# Patient Record
Sex: MALE | URBAN - METROPOLITAN AREA
[De-identification: names, ages, dates, MRNs, and addresses within clinical notes are randomized per-mention and may not be internally consistent; named-entity substitution may affect disease eponyms.]

---

## 2017-03-04 ENCOUNTER — EMERGENCY (EMERGENCY)
Facility: HOSPITAL | Age: 22
LOS: 0 days | Discharge: HOME | End: 2017-03-05
Attending: EMERGENCY MEDICINE | Admitting: EMERGENCY MEDICINE
Payer: COMMERCIAL

## 2017-03-04 VITALS — HEIGHT: 73 IN | WEIGHT: 315 LBS

## 2017-03-04 DIAGNOSIS — F10.129 ALCOHOL ABUSE WITH INTOXICATION, UNSPECIFIED: ICD-10-CM

## 2017-03-04 PROCEDURE — 99284 EMERGENCY DEPT VISIT MOD MDM: CPT

## 2017-03-04 RX ORDER — SODIUM CHLORIDE 9 MG/ML
2000 INJECTION INTRAMUSCULAR; INTRAVENOUS; SUBCUTANEOUS
Qty: 0 | Refills: 0 | Status: DISCONTINUED | OUTPATIENT
Start: 2017-03-04 | End: 2017-03-05

## 2017-03-04 RX ADMIN — SODIUM CHLORIDE 500 MILLILITER(S): 9 INJECTION INTRAMUSCULAR; INTRAVENOUS; SUBCUTANEOUS at 23:51

## 2017-03-04 NOTE — ED ADULT NURSE NOTE - OBJECTIVE STATEMENT
presents to the ED for alcohol intoxication. as per family at bedside, he vomited 3 times after binge drinking this evening. pt alert but not speaking coherently. no signs of respiratory distress. breathing even and unlabored. no complaints at this time. placed on cardiac monitor. will monitor.

## 2017-03-05 VITALS
SYSTOLIC BLOOD PRESSURE: 124 MMHG | OXYGEN SATURATION: 99 % | HEART RATE: 82 BPM | RESPIRATION RATE: 16 BRPM | TEMPERATURE: 99 F | DIASTOLIC BLOOD PRESSURE: 60 MMHG

## 2017-03-05 LAB
ALBUMIN SERPL ELPH-MCNC: 4.2 G/DL — SIGNIFICANT CHANGE UP (ref 3.3–5)
ALP SERPL-CCNC: 82 U/L — SIGNIFICANT CHANGE UP (ref 40–120)
ALT FLD-CCNC: 52 U/L — SIGNIFICANT CHANGE UP (ref 12–78)
ANION GAP SERPL CALC-SCNC: 11 MMOL/L — SIGNIFICANT CHANGE UP (ref 5–17)
AST SERPL-CCNC: 34 U/L — SIGNIFICANT CHANGE UP (ref 15–37)
BASOPHILS # BLD AUTO: 0.1 K/UL — SIGNIFICANT CHANGE UP (ref 0–0.2)
BASOPHILS NFR BLD AUTO: 1.7 % — SIGNIFICANT CHANGE UP (ref 0–2)
BILIRUB SERPL-MCNC: 0.3 MG/DL — SIGNIFICANT CHANGE UP (ref 0.2–1.2)
BUN SERPL-MCNC: 13 MG/DL — SIGNIFICANT CHANGE UP (ref 7–23)
CALCIUM SERPL-MCNC: 8.6 MG/DL — SIGNIFICANT CHANGE UP (ref 8.5–10.1)
CHLORIDE SERPL-SCNC: 110 MMOL/L — HIGH (ref 96–108)
CO2 SERPL-SCNC: 25 MMOL/L — SIGNIFICANT CHANGE UP (ref 22–31)
CREAT SERPL-MCNC: 1.27 MG/DL — SIGNIFICANT CHANGE UP (ref 0.5–1.3)
EOSINOPHIL # BLD AUTO: 0.1 K/UL — SIGNIFICANT CHANGE UP (ref 0–0.5)
EOSINOPHIL NFR BLD AUTO: 1.9 % — SIGNIFICANT CHANGE UP (ref 0–6)
ETHANOL SERPL-MCNC: 205 MG/DL — HIGH (ref 0–10)
GLUCOSE SERPL-MCNC: 114 MG/DL — HIGH (ref 70–99)
HCT VFR BLD CALC: 45.9 % — SIGNIFICANT CHANGE UP (ref 39–50)
HGB BLD-MCNC: 15.9 G/DL — SIGNIFICANT CHANGE UP (ref 13–17)
LYMPHOCYTES # BLD AUTO: 2.6 K/UL — SIGNIFICANT CHANGE UP (ref 1–3.3)
LYMPHOCYTES # BLD AUTO: 34.1 % — SIGNIFICANT CHANGE UP (ref 13–44)
MCHC RBC-ENTMCNC: 29.3 PG — SIGNIFICANT CHANGE UP (ref 27–34)
MCHC RBC-ENTMCNC: 34.7 GM/DL — SIGNIFICANT CHANGE UP (ref 32–36)
MCV RBC AUTO: 84.4 FL — SIGNIFICANT CHANGE UP (ref 80–100)
MONOCYTES # BLD AUTO: 0.8 K/UL — SIGNIFICANT CHANGE UP (ref 0–0.9)
MONOCYTES NFR BLD AUTO: 9.9 % — SIGNIFICANT CHANGE UP (ref 2–14)
NEUTROPHILS # BLD AUTO: 4 K/UL — SIGNIFICANT CHANGE UP (ref 1.8–7.4)
NEUTROPHILS NFR BLD AUTO: 52.4 % — SIGNIFICANT CHANGE UP (ref 43–77)
PLATELET # BLD AUTO: 280 K/UL — SIGNIFICANT CHANGE UP (ref 150–400)
POTASSIUM SERPL-MCNC: 3.9 MMOL/L — SIGNIFICANT CHANGE UP (ref 3.5–5.3)
POTASSIUM SERPL-SCNC: 3.9 MMOL/L — SIGNIFICANT CHANGE UP (ref 3.5–5.3)
PROT SERPL-MCNC: 7.5 GM/DL — SIGNIFICANT CHANGE UP (ref 6–8.3)
RBC # BLD: 5.44 M/UL — SIGNIFICANT CHANGE UP (ref 4.2–5.8)
RBC # FLD: 11.6 % — SIGNIFICANT CHANGE UP (ref 10.3–14.5)
SODIUM SERPL-SCNC: 146 MMOL/L — HIGH (ref 135–145)
WBC # BLD: 7.6 K/UL — SIGNIFICANT CHANGE UP (ref 3.8–10.5)
WBC # FLD AUTO: 7.6 K/UL — SIGNIFICANT CHANGE UP (ref 3.8–10.5)

## 2017-03-05 NOTE — ED PROVIDER NOTE - OBJECTIVE STATEMENT
22 year old male with no significant PMH other than obesity, with hx of drinking alcohol socially for the last two years, presents with cc of having ingested an unknown amount of hard liquor in a party with family present becoming intoxicated, and having vomited once before coming to the ED (no blood in the emesis and no coffee ground emesis). No cp, sob, or palpitation. No abdominal pain. Patient is currently too intoxicated to provide an inclusive HPI but spoke with patient's mom and family who state that patient is in great health and that there is no concern for SI and no recent trauma or drastic losses at work, loss of loved one or any other social or health stresses. No recent surgeries. No recent trauma. Tonight patient did not hit his head on the ground, and did not have any impact of his body to the ground, nor did he complain of any neck pain, HA, chest pain, abdominal pain, and spinal pain. No rash. No blood in urine. No known hx of or evidence of IVDU. Sarthak Tao MD (HPI note): 22 year old male with no significant PMH other than obesity, with hx of drinking alcohol socially for the last two years, presents with cc of having ingested an unknown amount of hard liquor in a party with family present becoming intoxicated, and having vomited once before coming to the ED (no blood in the emesis and no coffee ground emesis). No cp, sob, or palpitation. No abdominal pain. Patient is currently too intoxicated to provide an inclusive HPI but spoke with patient's mom and family who state that patient is in great health and that there is no concern for SI and no recent trauma or drastic losses at work, loss of loved one or any other social or health stresses. No recent surgeries. No recent trauma. Tonight patient did not hit his head on the ground, and did not have any impact of his body to the ground, nor did he complain of any neck pain, HA, chest pain, abdominal pain, and spinal pain. No rash. No blood in urine. No known hx of or evidence of IVDU.

## 2017-03-05 NOTE — ED PROVIDER NOTE - NS ED ROS FT
Unable to get a full review of system due to intoxicated affect. However as per family no eye complaints, no fever or chills. No skin conditions.

## 2017-03-05 NOTE — ED PROVIDER NOTE - NEUROLOGICAL, MLM
Intoxicated affect. Responds to questions by nodding but unable to have full discussion. No facial asymmetry. No focal neurological deficits. CN II XII grossly intact. Intact sensation in all limbs.

## 2017-03-05 NOTE — ED PROVIDER NOTE - PROGRESS NOTE DETAILS
Sarthak Tao MD (progress note): Patient sleeping comfortably. No acute discomfort. Nontoxic appearing. Will continue to monitor patient to sober up. Sign out given to Dr. De La Rosa to continue the care of this patient. Plan is to re-evaluate patient prior to discharge to ensure in no distress, back to baseline and to ensure no concern for SI. Sign out is appreciated. AS:  Pt awake, alert, sober, no complaints.  "drank a couple really strong drinks tonight"  No SI/HI or mental health concerns.  Counseled on ETOH.  Mom and pt anxious for discharge.

## 2017-03-05 NOTE — ED PROVIDER NOTE - MEDICAL DECISION MAKING DETAILS
Patient with alcohol intoxication, no acute respiratory distress, responds to stimuli but to intoxicated to provide a coherent history. Patient's care signed out to Dr. De La Rosa. Plan is to dispo patient when patient is ambulatory, back to baseline, tolerating PO, and in no acute distress.